# Patient Record
Sex: FEMALE | Race: WHITE | NOT HISPANIC OR LATINO | Employment: FULL TIME | URBAN - METROPOLITAN AREA
[De-identification: names, ages, dates, MRNs, and addresses within clinical notes are randomized per-mention and may not be internally consistent; named-entity substitution may affect disease eponyms.]

---

## 2024-05-23 ENCOUNTER — OFFICE VISIT (OUTPATIENT)
Dept: DERMATOLOGY | Facility: CLINIC | Age: 24
End: 2024-05-23
Payer: COMMERCIAL

## 2024-05-23 DIAGNOSIS — L70.0 ACNE VULGARIS: Primary | ICD-10-CM

## 2024-05-23 PROCEDURE — 99214 OFFICE O/P EST MOD 30 MIN: CPT | Performed by: DERMATOLOGY

## 2024-05-23 RX ORDER — KETOCONAZOLE 20 MG/G
CREAM TOPICAL
COMMUNITY
Start: 2023-09-08

## 2024-05-23 RX ORDER — SPIRONOLACTONE 100 MG/1
150 TABLET, FILM COATED ORAL DAILY
Qty: 45 TABLET | Refills: 3 | Status: SHIPPED | OUTPATIENT
Start: 2024-05-23 | End: 2024-09-20

## 2024-05-23 RX ORDER — SPIRONOLACTONE 100 MG/1
TABLET, FILM COATED ORAL
COMMUNITY
Start: 2020-09-29 | End: 2024-05-23 | Stop reason: SDUPTHER

## 2024-05-23 RX ORDER — TRETINOIN 1 MG/G
CREAM TOPICAL
COMMUNITY
Start: 2023-09-08

## 2024-05-23 RX ORDER — CLINDAMYCIN PHOSPHATE 10 MG/G
GEL TOPICAL
COMMUNITY
Start: 2023-09-08

## 2024-05-23 RX ORDER — SULFACETAMIDE SODIUM, SULFUR 100; 50 MG/G; MG/G
EMULSION TOPICAL
Qty: 227 G | Refills: 3 | Status: SHIPPED | OUTPATIENT
Start: 2024-05-23

## 2024-05-23 ASSESSMENT — DERMATOLOGY QUALITY OF LIFE (QOL) ASSESSMENT
RATE HOW BOTHERED YOU ARE BY EFFECTS OF YOUR SKIN PROBLEMS ON YOUR ACTIVITIES (EG, GOING OUT, ACCOMPLISHING WHAT YOU WANT, WORK ACTIVITIES OR YOUR RELATIONSHIPS WITH OTHERS): 0 - NEVER BOTHERED
RATE HOW BOTHERED YOU ARE BY EFFECTS OF YOUR SKIN PROBLEMS ON YOUR ACTIVITIES (EG, GOING OUT, ACCOMPLISHING WHAT YOU WANT, WORK ACTIVITIES OR YOUR RELATIONSHIPS WITH OTHERS): 0 - NEVER BOTHERED
RATE HOW EMOTIONALLY BOTHERED YOU ARE BY YOUR SKIN PROBLEM (FOR EXAMPLE, WORRY, EMBARRASSMENT, FRUSTRATION): 5
RATE HOW BOTHERED YOU ARE BY SYMPTOMS OF YOUR SKIN PROBLEM (EG, ITCHING, STINGING BURNING, HURTING OR SKIN IRRITATION): 2
WHAT SINGLE SKIN CONDITION LISTED BELOW IS THE PATIENT ANSWERING THE QUALITY-OF-LIFE ASSESSMENT QUESTIONS ABOUT: ACNE
RATE HOW EMOTIONALLY BOTHERED YOU ARE BY YOUR SKIN PROBLEM (FOR EXAMPLE, WORRY, EMBARRASSMENT, FRUSTRATION): 5
WHAT SINGLE SKIN CONDITION LISTED BELOW IS THE PATIENT ANSWERING THE QUALITY-OF-LIFE ASSESSMENT QUESTIONS ABOUT: ACNE
RATE HOW BOTHERED YOU ARE BY SYMPTOMS OF YOUR SKIN PROBLEM (EG, ITCHING, STINGING BURNING, HURTING OR SKIN IRRITATION): 2

## 2024-05-23 ASSESSMENT — ITCH NUMERIC RATING SCALE: HOW SEVERE IS YOUR ITCHING?: 0

## 2024-05-23 NOTE — PROGRESS NOTES
Subjective     Jason Ball is a 23 y.o. female who presents for the following: Acne (Pt here to follow up for Acne located on face. Pt currently using Spironolactone 100 mg every day, Clindamycin gel, and Tretinoin 0.1% on occasion. Pt reports condition is the same. ). Stopped the clindamycin as she felt like it was making her redder when she went out in the sun. She was good until she started to break out more recently on the jawline. Reports nose of a concern with black heads    Review of Systems:  No other skin or systemic complaints other than what is documented elsewhere in the note.    The following portions of the chart were reviewed this encounter and updated as appropriate:          Skin Cancer History  No skin cancer on file.      Specialty Problems    None       Objective   Well appearing patient in no apparent distress; mood and affect are within normal limits.    A focused skin examination was performed. All findings within normal limits unless otherwise noted below.    Assessment/Plan   1. Acne vulgaris  Head - Anterior (Face)  Atrophic macules on bilateral cheeks  Cystic papules on jawline  Prominent pores nose, medial cheeks    The chronic and intermittently flaring nature of acne was reviewed with the patient today. Patient advised that acne is multifactorial. Treatment options were reviewed with the patient. Advised that typically takes 2-3 months to see 60-80% improvement and treatments may worsen acne appearance prior to improvement.     Wash face twice daily  Do not spot treat, treat the entire surface area to treat current breakouts and prevent new breakouts    - Discussed isotretinoin vs. Increased spironolactone + other topicals. The side effects of isotretinoin were reviewed today these include, but are not limited to:  -expected redness, dryness of the skin and chapped/dry lips  -increased sensitivity to the sun  -potential for joint aches, joint stiffness, headaches, changes in vision or  blurry vision  -hairloss  -depression, suicidal thoughts, mood swings  -birth defects (female patients)    While on this medication you may not donate blood or share the medication.    For females only:  on this medication you do not want to become pregnant on this medication, therefore we must document birth control methods throughout the treatment, obtain monthly pregnancy tests and  the medication within a window of that pregnancy test.    Patient elected to hold off on isotretinoin until September or October if not improved    Treatment recommendations:  - Increase spironolactone to 150mg once daily. Side effects of spironolactone were reviewed including increased potassium levels, gynecomastia, breast tenderness, abnormalities in menstrual cycle, birth defects. Studies have shown there is minimal risk with use in patients with a history of hormonally driving malignancies, such as malignancies of the breast.  - Discontinue clindamycin 1% lotion  - Start sulfacetamide sodium sulfur 10-5% cleanser daily in morning  - Cerave hydrating cleanser alternative time of day to wash face  - Continue tretinoin 0.1% cream - pea sized amount to face daily in evening. Side effects of topical retinoids reviewed including increased photosensitivity, dryness, irritation and redness. To help alleviate side effects patient advised to apply initially every 2-3 nights and increase to daily as tolerated or apply topical non-comedogenic moisturizer prior to application.    Pt inquired about chemical peels for scarring - advised should avoid tretinoin at least 1 week prior to peel.    Encouraged use of daily sunscreen - samples provided today.          sulfacetamide sodium-sulfur (Avar, Plexion) 10-5 % (w/w) topical emulsion - Head - Anterior (Face)  Lather for 1-2 minutes then rinse once daily    Related Medications  spironolactone (Aldactone) 100 mg tablet  Take 1.5 tablets (150 mg) by mouth once daily.

## 2024-07-23 DIAGNOSIS — L70.0 ACNE VULGARIS: ICD-10-CM

## 2024-07-23 RX ORDER — SPIRONOLACTONE 100 MG/1
150 TABLET, FILM COATED ORAL DAILY
Qty: 135 TABLET | Refills: 0 | Status: SHIPPED | OUTPATIENT
Start: 2024-07-23 | End: 2024-10-21

## 2024-09-25 ENCOUNTER — APPOINTMENT (OUTPATIENT)
Dept: DERMATOLOGY | Facility: CLINIC | Age: 24
End: 2024-09-25
Payer: COMMERCIAL

## 2024-09-25 DIAGNOSIS — L70.0 ACNE VULGARIS: Primary | ICD-10-CM

## 2024-09-25 PROCEDURE — 99213 OFFICE O/P EST LOW 20 MIN: CPT | Performed by: DERMATOLOGY

## 2024-09-25 RX ORDER — CLINDAMYCIN PHOSPHATE 10 MG/ML
1 SOLUTION TOPICAL DAILY
Qty: 69 EACH | Refills: 5 | Status: SHIPPED | OUTPATIENT
Start: 2024-09-25 | End: 2025-03-24

## 2024-09-25 ASSESSMENT — DERMATOLOGY PATIENT ASSESSMENT
ARE YOU TRYING TO GET PREGNANT: NO
HAVE YOU HAD OR DO YOU HAVE A STAPH INFECTION: NO
FOR PATIENTS COMING IN FOR A FOLLOW-UP VISIT - HAVE THERE BEEN ANY CHANGES IN YOUR HEALTH SINCE YOUR LAST VISIT: NO
HAVE YOU HAD OR DO YOU HAVE VASCULAR DISEASE: NO
WHAT TYPE OF BIRTH CONTROL: IUD
ARE YOU AN ORGAN TRANSPLANT RECIPIENT: NO
ARE YOU ON BIRTH CONTROL: YES
DO YOU HAVE ANY NEW OR CHANGING LESIONS: NO
DO YOU USE SUNSCREEN: OCCASIONALLY
DO YOU HAVE IRREGULAR MENSTRUAL CYCLES: NO
DO YOU USE A TANNING BED: YES, PREVIOUSLY

## 2024-09-25 ASSESSMENT — DERMATOLOGY QUALITY OF LIFE (QOL) ASSESSMENT
DATE THE QUALITY-OF-LIFE ASSESSMENT WAS COMPLETED: 67108
RATE HOW BOTHERED YOU ARE BY EFFECTS OF YOUR SKIN PROBLEMS ON YOUR ACTIVITIES (EG, GOING OUT, ACCOMPLISHING WHAT YOU WANT, WORK ACTIVITIES OR YOUR RELATIONSHIPS WITH OTHERS): 0 - NEVER BOTHERED
WHAT SINGLE SKIN CONDITION LISTED BELOW IS THE PATIENT ANSWERING THE QUALITY-OF-LIFE ASSESSMENT QUESTIONS ABOUT: ACNE
RATE HOW BOTHERED YOU ARE BY SYMPTOMS OF YOUR SKIN PROBLEM (EG, ITCHING, STINGING BURNING, HURTING OR SKIN IRRITATION): 3
ARE THERE EXCLUSIONS OR EXCEPTIONS FOR THE QUALITY OF LIFE ASSESSMENT: NO
RATE HOW EMOTIONALLY BOTHERED YOU ARE BY YOUR SKIN PROBLEM (FOR EXAMPLE, WORRY, EMBARRASSMENT, FRUSTRATION): 0 - NEVER BOTHERED

## 2024-09-25 ASSESSMENT — ITCH NUMERIC RATING SCALE: HOW SEVERE IS YOUR ITCHING?: 0

## 2024-09-25 ASSESSMENT — PATIENT GLOBAL ASSESSMENT (PGA): PATIENT GLOBAL ASSESSMENT: PATIENT GLOBAL ASSESSMENT:  3 - MODERATE

## 2024-09-25 NOTE — PROGRESS NOTES
Subjective     Jason Ball is a 24 y.o. female who presents for the following: Acne (Pt here to follow up for Acne located on face. Pt currently using Tretinoin 0.1% every 3 days, Sulfa Cleanse once a week, and Spironolactone 150 mg every day. Has tried Clindamycin Lotion. Pt reports condition is the same. ). She did not increase the spironolactone - she has only been taking 100mg daily.  She was good until 2 weeks ago.    Review of Systems:  No other skin or systemic complaints other than what is documented elsewhere in the note.    The following portions of the chart were reviewed this encounter and updated as appropriate:          Skin Cancer History  No skin cancer on file.      Specialty Problems    None       Objective   Well appearing patient in no apparent distress; mood and affect are within normal limits.    A focused skin examination was performed. All findings within normal limits unless otherwise noted below.    Assessment/Plan   1. Acne vulgaris  Head - Anterior (Face)  Atrophic macules on bilateral cheeks  Less prominent pores on nose  Comedonal - closed and excoriated papules on the chin/jawline.    The chronic and intermittently flaring nature of acne was reviewed with the patient today. Patient advised that acne is multifactorial. Treatment options were reviewed with the patient. Advised that typically takes 2-3 months to see 60-80% improvement and treatments may worsen acne appearance prior to improvement.     Wash face twice daily  Do not spot treat, treat the entire surface area to treat current breakouts and prevent new breakouts    Treatment recommendations:  - no need to pursue isotretinoin at this time  - Given improvement and inconsistent use of tretinoin - encouraged application 3x weekly, increase to daily as tolerated  - Continue sulfacetamide sodium - sulfur 10-5% cleanser daily  - Continue spironolactone 100mg once daily  - Restart clindamycin 1% swabs once daily in the morning    We  did briefly discussed Fraxel with the patient.  Fraxel laser is a laser used for treatment of fine to moderate lines from aging, brown spots and some types of scarring.  Typically more than 1 treatment is needed for treatment, depending on the goal of treatment. For brown spots 1 treat may be adequate in some circumstances. For fine lines and wrinkles typically 3-4 treatments are needed.    If there is a known history of cold sores medication can be prescribed to prevent this from occurring as the procedure may cause cold sores to occur.  Topical retinoids should be avoided 1 week before and 1 week after the procedure.    Following treatment the face will be red, it will peel and will swell. The downtime of the procedure is approximately 7days.    Price estimate provided for 1/2 face and if resident available resident discount.    Follow up in 6 months.    clindamycin (Cleocin T) 1 % swab - Head - Anterior (Face)  Apply 1 Application topically once daily. To face in morning    Related Procedures  Follow Up In Dermatology - Established Patient    Related Medications  sulfacetamide sodium-sulfur (Avar, Plexion) 10-5 % (w/w) topical emulsion  Lather for 1-2 minutes then rinse once daily    spironolactone (Aldactone) 100 mg tablet  Take 1.5 tablets (150 mg) by mouth once daily.

## 2025-02-10 DIAGNOSIS — L70.0 ACNE VULGARIS: ICD-10-CM

## 2025-02-10 RX ORDER — SPIRONOLACTONE 100 MG/1
150 TABLET, FILM COATED ORAL DAILY
Qty: 135 TABLET | Refills: 0 | Status: SHIPPED | OUTPATIENT
Start: 2025-02-10

## 2025-03-26 ENCOUNTER — APPOINTMENT (OUTPATIENT)
Dept: DERMATOLOGY | Facility: CLINIC | Age: 25
End: 2025-03-26
Payer: COMMERCIAL

## 2025-03-26 DIAGNOSIS — L70.0 ACNE VULGARIS: ICD-10-CM

## 2025-03-26 PROCEDURE — 99213 OFFICE O/P EST LOW 20 MIN: CPT | Performed by: DERMATOLOGY

## 2025-03-26 PROCEDURE — 1036F TOBACCO NON-USER: CPT | Performed by: DERMATOLOGY

## 2025-03-26 NOTE — PROGRESS NOTES
Subjective     Jason Ball is a 24 y.o. female who presents for the following: Acne (6 month - LV 09/25/24 - Patient states her acne has remained the same while taking Clindamycin, Tretinoin, Sulfacetamide, and Spironolactone  Area(s) of concern: face).   Last visit encouraged use of tretinoin consistently and advised to restart clindamycin swabs as well as continue sulfur cleanser and spironolactone.     She uses tretinoin 3x weekly and reports it dries her out if she uses it more frequently. Taking spironolactone 100mg daily. Using sulfur was as well consistently.    She is happy with regimen, just wants to save for microneedling.     Review of Systems:  No other skin or systemic complaints other than what is documented elsewhere in the note.    The following portions of the chart were reviewed this encounter and updated as appropriate:   Tobacco  Allergies  Meds  Problems  Med Hx  Surg Hx  Fam Hx         Skin Cancer History  No skin cancer on file.      Specialty Problems    None       Objective   Well appearing patient in no apparent distress; mood and affect are within normal limits.    A focused skin examination was performed of the face, neck. All findings within normal limits unless otherwise noted below.    Assessment/Plan   1. Acne vulgaris  Head - Anterior (Face)  Atrophic macules on bilateral cheeks  Less prominent pores on nose  Comedonal - closed comedonal papules on the jawline    The chronic and intermittently flaring nature of acne was reviewed with the patient today. Patient advised that acne is multifactorial. Treatment options were reviewed with the patient. Advised that typically takes 2-3 months to see 60-80% improvement and treatments may worsen acne appearance prior to improvement.     Wash face twice daily  Do not spot treat, treat the entire surface area to treat current breakouts and prevent new breakouts    Treatment recommendations:  - no need to pursue isotretinoin at this  time  - Continue tretinoin 0.1% cream 3x weekly, increase to daily as tolerated  - Continue sulfacetamide sodium - sulfur 10-5% cleanser daily  - Continue spironolactone 100mg once daily  - Restart clindamycin 1% swabs once daily in the morning  - call for refills.    Advised that microneedling would likely be beneficial for her scarring and less downtime as opposed to the Fraxel laser.     Follow up yearly.     Related Procedures  Follow Up In Dermatology - Established Patient    Related Medications  sulfacetamide sodium-sulfur (Avar, Plexion) 10-5 % (w/w) topical emulsion  Lather for 1-2 minutes then rinse once daily    spironolactone (Aldactone) 100 mg tablet  Take 1.5 tablets (150 mg) by mouth once daily.        Follow up In 1 year or sooner if needed.

## 2025-08-04 DIAGNOSIS — L70.0 ACNE VULGARIS: ICD-10-CM

## 2025-08-05 RX ORDER — SPIRONOLACTONE 100 MG/1
150 TABLET, FILM COATED ORAL DAILY
Qty: 135 TABLET | Refills: 1 | Status: SHIPPED | OUTPATIENT
Start: 2025-08-05

## 2026-04-02 ENCOUNTER — APPOINTMENT (OUTPATIENT)
Dept: DERMATOLOGY | Facility: CLINIC | Age: 26
End: 2026-04-02
Payer: COMMERCIAL